# Patient Record
Sex: MALE | Race: WHITE | NOT HISPANIC OR LATINO | Employment: FULL TIME | URBAN - METROPOLITAN AREA
[De-identification: names, ages, dates, MRNs, and addresses within clinical notes are randomized per-mention and may not be internally consistent; named-entity substitution may affect disease eponyms.]

---

## 2022-05-30 ENCOUNTER — OFFICE VISIT (OUTPATIENT)
Dept: URGENT CARE | Facility: CLINIC | Age: 27
End: 2022-05-30
Payer: COMMERCIAL

## 2022-05-30 VITALS
DIASTOLIC BLOOD PRESSURE: 94 MMHG | HEIGHT: 68 IN | TEMPERATURE: 98 F | WEIGHT: 205 LBS | SYSTOLIC BLOOD PRESSURE: 135 MMHG | BODY MASS INDEX: 31.07 KG/M2 | RESPIRATION RATE: 16 BRPM | HEART RATE: 76 BPM | OXYGEN SATURATION: 95 %

## 2022-05-30 DIAGNOSIS — R19.7 NAUSEA VOMITING AND DIARRHEA: ICD-10-CM

## 2022-05-30 DIAGNOSIS — A08.4 VIRAL GASTROENTERITIS: Primary | ICD-10-CM

## 2022-05-30 DIAGNOSIS — R11.2 NAUSEA VOMITING AND DIARRHEA: ICD-10-CM

## 2022-05-30 LAB
CTP QC/QA: YES
SARS-COV-2 RDRP RESP QL NAA+PROBE: NEGATIVE

## 2022-05-30 PROCEDURE — 3008F BODY MASS INDEX DOCD: CPT | Mod: CPTII,S$GLB,, | Performed by: NURSE PRACTITIONER

## 2022-05-30 PROCEDURE — 3075F SYST BP GE 130 - 139MM HG: CPT | Mod: CPTII,S$GLB,, | Performed by: NURSE PRACTITIONER

## 2022-05-30 PROCEDURE — 3075F PR MOST RECENT SYSTOLIC BLOOD PRESS GE 130-139MM HG: ICD-10-PCS | Mod: CPTII,S$GLB,, | Performed by: NURSE PRACTITIONER

## 2022-05-30 PROCEDURE — U0002 COVID-19 LAB TEST NON-CDC: HCPCS | Mod: QW,S$GLB,, | Performed by: NURSE PRACTITIONER

## 2022-05-30 PROCEDURE — 99203 PR OFFICE/OUTPT VISIT, NEW, LEVL III, 30-44 MIN: ICD-10-PCS | Mod: S$GLB,,, | Performed by: NURSE PRACTITIONER

## 2022-05-30 PROCEDURE — 1160F PR REVIEW ALL MEDS BY PRESCRIBER/CLIN PHARMACIST DOCUMENTED: ICD-10-PCS | Mod: CPTII,S$GLB,, | Performed by: NURSE PRACTITIONER

## 2022-05-30 PROCEDURE — S0119 PR ONDANSETRON, ORAL, 4MG: ICD-10-PCS | Mod: S$GLB,,, | Performed by: NURSE PRACTITIONER

## 2022-05-30 PROCEDURE — 3080F PR MOST RECENT DIASTOLIC BLOOD PRESSURE >= 90 MM HG: ICD-10-PCS | Mod: CPTII,S$GLB,, | Performed by: NURSE PRACTITIONER

## 2022-05-30 PROCEDURE — 1159F MED LIST DOCD IN RCRD: CPT | Mod: CPTII,S$GLB,, | Performed by: NURSE PRACTITIONER

## 2022-05-30 PROCEDURE — U0002: ICD-10-PCS | Mod: QW,S$GLB,, | Performed by: NURSE PRACTITIONER

## 2022-05-30 PROCEDURE — S0119 ONDANSETRON 4 MG: HCPCS | Mod: S$GLB,,, | Performed by: NURSE PRACTITIONER

## 2022-05-30 PROCEDURE — 3080F DIAST BP >= 90 MM HG: CPT | Mod: CPTII,S$GLB,, | Performed by: NURSE PRACTITIONER

## 2022-05-30 PROCEDURE — 1160F RVW MEDS BY RX/DR IN RCRD: CPT | Mod: CPTII,S$GLB,, | Performed by: NURSE PRACTITIONER

## 2022-05-30 PROCEDURE — 3008F PR BODY MASS INDEX (BMI) DOCUMENTED: ICD-10-PCS | Mod: CPTII,S$GLB,, | Performed by: NURSE PRACTITIONER

## 2022-05-30 PROCEDURE — 99203 OFFICE O/P NEW LOW 30 MIN: CPT | Mod: S$GLB,,, | Performed by: NURSE PRACTITIONER

## 2022-05-30 PROCEDURE — 1159F PR MEDICATION LIST DOCUMENTED IN MEDICAL RECORD: ICD-10-PCS | Mod: CPTII,S$GLB,, | Performed by: NURSE PRACTITIONER

## 2022-05-30 RX ORDER — ONDANSETRON 4 MG/1
4 TABLET, ORALLY DISINTEGRATING ORAL
Status: COMPLETED | OUTPATIENT
Start: 2022-05-30 | End: 2022-05-30

## 2022-05-30 RX ORDER — ONDANSETRON 4 MG/1
4 TABLET, ORALLY DISINTEGRATING ORAL EVERY 6 HOURS PRN
Qty: 10 TABLET | Refills: 0 | Status: SHIPPED | OUTPATIENT
Start: 2022-05-30

## 2022-05-30 RX ADMIN — ONDANSETRON 4 MG: 4 TABLET, ORALLY DISINTEGRATING ORAL at 09:05

## 2022-05-30 NOTE — PROGRESS NOTES
"Subjective:       Patient ID: Nino Beckham is a 27 y.o. male.    Vitals:  height is 5' 8" (1.727 m) and weight is 93 kg (205 lb). His temperature is 97.9 °F (36.6 °C). His blood pressure is 135/94 (abnormal) and his pulse is 76. His respiration is 16 and oxygen saturation is 95%.     Chief Complaint: Diarrhea    Pt stated that he has been down here for a bachelor part for his friend . Pt had oysters yesterday and was heavily drinking and pt has been having diarrhea, nausea, and vomiting since 3am . Pt cant keep anything down. Pt pharmacy updated .   Provider note below:  This is a 27 y.o. male who presents today with a chief complaint of nausea, vomiting and diarrhea started early this morning, patient reports vomiting 5 times and diarrhea 3-4 times, patient reports he has been here for his friends bachelor party and had oysters last night along with heavy drinking, denies fever, body aches or chills, denies cough, wheezing or shortness of breath, denies abdominal pain, denies chest pain or dizziness positional lightheadedness, denies sore throat or trouble swallowing, denies loss of taste or smell, or any other symptoms        Diarrhea   This is a new problem. The current episode started today. The problem occurs 2 to 4 times per day. The problem has been unchanged. The stool consistency is described as watery. The patient states that diarrhea does not awaken him from sleep. Associated symptoms include headaches, sweats and vomiting. Pertinent negatives include no abdominal pain, arthralgias, bloating, chills, coughing, fever, increased  flatus, myalgias, URI or weight loss. Nothing aggravates the symptoms. Risk factors include suspect food intake. He has tried nothing for the symptoms. The treatment provided no relief.       Constitution: Negative for chills, sweating, fatigue and fever.   HENT: Negative for postnasal drip, sinus pain, sinus pressure and sore throat.    Cardiovascular: Negative for chest pain. "   Respiratory: Negative for cough, sputum production, shortness of breath and wheezing.    Gastrointestinal: Positive for nausea, vomiting and diarrhea. Negative for abdominal pain and constipation.   Musculoskeletal: Negative for joint pain and muscle ache.   Neurological: Positive for headaches. Negative for dizziness and light-headedness.       Objective:      Physical Exam   Constitutional: He is oriented to person, place, and time. He appears well-developed.   HENT:   Head: Normocephalic and atraumatic.   Ears:   Right Ear: External ear normal.   Left Ear: External ear normal.   Nose: Nose normal.   Mouth/Throat: Uvula is midline, oropharynx is clear and moist and mucous membranes are normal.   Eyes: Conjunctivae and lids are normal. Extraocular movement intact   Neck: Trachea normal. Neck supple.   Cardiovascular: Normal rate, regular rhythm and normal heart sounds.   Pulmonary/Chest: Effort normal and breath sounds normal. No respiratory distress.   Abdominal: Normal appearance and bowel sounds are normal. He exhibits no distension, no abdominal bruit, no pulsatile midline mass and no mass. Soft. There is no abdominal tenderness. There is no rebound, no guarding, no tenderness at McBurney's point, negative Sanders's sign, no left CVA tenderness, negative Rovsing's sign, negative psoas sign, no right CVA tenderness and negative obturator sign.   Musculoskeletal: Normal range of motion.         General: Normal range of motion.   Neurological: He is alert and oriented to person, place, and time. He has normal strength.   Skin: Skin is warm, dry, intact, not diaphoretic and not pale.   Psychiatric: His speech is normal and behavior is normal. Judgment and thought content normal.   Nursing note and vitals reviewed.        Results for orders placed or performed in visit on 05/30/22   POCT COVID-19 Rapid Screening   Result Value Ref Range    POC Rapid COVID Negative Negative     Acceptable Yes           Patient in no acute distress.  Vitals reassuring.  Discussed results/diagnosis/plan in depth with patient in clinic. Strict precautions given to patient to monitor for worsening signs and symptoms. Advised to follow up with primary.All questions answered. Strict ER precautions given. If your symptoms worsens or fail to improve you should go to the Emergency Room. Discharge and follow-up instructions given verbally/printed. Discharge and follow-up instructions discussed with the patient who expressed understanding and willingness to comply with my recommendations.Patient voiced understanding and in agreement with current treatment plan.     Please be advised this text was dictated with Predect software and may contain errors due to translation.      Assessment:       1. Viral gastroenteritis    2. Nausea vomiting and diarrhea          Plan:         Viral gastroenteritis    Nausea vomiting and diarrhea  -     POCT COVID-19 Rapid Screening    Other orders  -     ondansetron disintegrating tablet 4 mg  -     ondansetron (ZOFRAN-ODT) 4 MG TbDL; Take 1 tablet (4 mg total) by mouth every 6 (six) hours as needed (nausea).  Dispense: 10 tablet; Refill: 0           Medical Decision Making:   Differential Diagnosis:   Differential diagnoses included but not limited to UTI, pyelonephritis, viral gastroenteritis, gallstones, acute pancreatitis, diverticulitis, appendicitis, renal colic, constipation, GERD, IBD    Clinical Tests:   Lab Tests: Reviewed  Urgent Care Management:  Presenting to the Urgent Care for nausea, non-bloody/bilious vomiting and dirrhea Denies fever, significant weight loss, recent hospitalization or use of antibiotics, or symptoms lasting greater than a week. Symptoms noted after eating dinner. Vitals reassuring. Patient is non-toxic appearing and in no acute distress. Abdomen soft, and non tender.  Moist mucus membranes- no evidence of volume depletion or dehydration. Zofran given in clinic. PO  challenged passed in clinic.      Given rapid onset and characteristics of this patient's spectrum of symptoms, short duration of symptoms, and benign abdominal exam, patient likely has a variety of viral gastroenteritis. I do not feel there is indication for stool studies to assess for bacterial etiology at this time. No strong indication for imaging of abdomen at this time. Given the above, I have also considered but doubt C. difficile, IBD, infectious colitis, SBO, pancreatitis, acute cholecystitis, pyelonephritis, ureteral stone, and appendicitis.      ER Precautions discussed.        Patient Instructions   PLEASE READ YOUR DISCHARGE INSTRUCTIONS ENTIRELY AS IT CONTAINS IMPORTANT INFORMATION.    Take the zofran for nausea (it dissolves under your tongue)       Use gatorade/pedialyte or rehydration packets to help stay hydrated. Vitamin water and plain water do not contain rehydrating electrolytes.  Increase clear liquids (water, gatorade, pedialyte, broths, jello, etc) Hold off on solids for 12-18 hours. Then advance to BRAT diet (banana, rice, applesauce, tea, toast/crackers), then advance further as tolerated. Avoid spicy or fatty foods.   Use Peptobismol to help alleviate your diarrhea symptoms.     Avoid imodium unless you have more than 6 loose stools in 24 hours.       Wash hands frequently while sick. Avoid ibuprofen or other NSAIDS until you are well.     Please go to the ER if you experience worsening pain, blood in your vomit or stool, high fever, dizziness, fainting, swelling of your abdomen, inability to pass gas or stool.           Please arrange follow up with your primary medical clinic as soon as possible. You must understand that you've received an Urgent Care treatment only and that you may be released before all of your medical problems are known or treated. You, the patient, will arrange for follow up as instructed. If your symptoms worsen or fail to improve you should go to the Emergency  Room.  WE CANNOT RULE OUT ALL POSSIBLE CAUSES OF YOUR SYMPTOMS IN THE URGENT CARE SETTING PLEASE GO TO THE ER IF YOU FEELS YOUR CONDITION IS WORSENING OR YOU WOULD LIKE EMERGENT EVALUATION.

## 2022-05-30 NOTE — PATIENT INSTRUCTIONS
PLEASE READ YOUR DISCHARGE INSTRUCTIONS ENTIRELY AS IT CONTAINS IMPORTANT INFORMATION.    Take the zofran for nausea (it dissolves under your tongue)       Use gatorade/pedialyte or rehydration packets to help stay hydrated. Vitamin water and plain water do not contain rehydrating electrolytes.  Increase clear liquids (water, gatorade, pedialyte, broths, jello, etc) Hold off on solids for 12-18 hours. Then advance to BRAT diet (banana, rice, applesauce, tea, toast/crackers), then advance further as tolerated. Avoid spicy or fatty foods.   Use Peptobismol to help alleviate your diarrhea symptoms.     Avoid imodium unless you have more than 6 loose stools in 24 hours.       Wash hands frequently while sick. Avoid ibuprofen or other NSAIDS until you are well.     Please go to the ER if you experience worsening pain, blood in your vomit or stool, high fever, dizziness, fainting, swelling of your abdomen, inability to pass gas or stool.           Please arrange follow up with your primary medical clinic as soon as possible. You must understand that you've received an Urgent Care treatment only and that you may be released before all of your medical problems are known or treated. You, the patient, will arrange for follow up as instructed. If your symptoms worsen or fail to improve you should go to the Emergency Room.  WE CANNOT RULE OUT ALL POSSIBLE CAUSES OF YOUR SYMPTOMS IN THE URGENT CARE SETTING PLEASE GO TO THE ER IF YOU FEELS YOUR CONDITION IS WORSENING OR YOU WOULD LIKE EMERGENT EVALUATION.